# Patient Record
Sex: MALE | URBAN - METROPOLITAN AREA
[De-identification: names, ages, dates, MRNs, and addresses within clinical notes are randomized per-mention and may not be internally consistent; named-entity substitution may affect disease eponyms.]

---
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
## 2024-11-26 NOTE — PROCEDURE: TREATMENT REGIMEN
Detail Level: Zone
Initiate Treatment: Ketoconazole 2% shampoo 3x a week to scalp and beard , Fluocinonide 0.05% scalp solution bid x2weeks on x2weeks off repeat taper to scalp and hydrocortisone 2.5% cream 2-3 a week to beard
Yes